# Patient Record
Sex: MALE | Race: WHITE
[De-identification: names, ages, dates, MRNs, and addresses within clinical notes are randomized per-mention and may not be internally consistent; named-entity substitution may affect disease eponyms.]

---

## 2021-08-31 ENCOUNTER — HOSPITAL ENCOUNTER (INPATIENT)
Dept: HOSPITAL 79 - ER1 | Age: 36
LOS: 2 days | Discharge: HOME | DRG: 177 | End: 2021-09-02
Attending: STUDENT IN AN ORGANIZED HEALTH CARE EDUCATION/TRAINING PROGRAM | Admitting: EMERGENCY MEDICINE
Payer: COMMERCIAL

## 2021-08-31 VITALS — HEIGHT: 74 IN | BODY MASS INDEX: 40.43 KG/M2 | WEIGHT: 315 LBS

## 2021-08-31 DIAGNOSIS — R73.03: ICD-10-CM

## 2021-08-31 DIAGNOSIS — K76.0: ICD-10-CM

## 2021-08-31 DIAGNOSIS — U07.1: Primary | ICD-10-CM

## 2021-08-31 DIAGNOSIS — Z79.899: ICD-10-CM

## 2021-08-31 DIAGNOSIS — R74.01: ICD-10-CM

## 2021-08-31 DIAGNOSIS — J12.82: ICD-10-CM

## 2021-08-31 DIAGNOSIS — E66.01: ICD-10-CM

## 2021-08-31 DIAGNOSIS — J96.01: ICD-10-CM

## 2021-08-31 LAB
BUN/CREATININE RATIO: 13 (ref 0–10)
HGB BLD-MCNC: 14.1 GM/DL (ref 14–17.5)
RED BLOOD COUNT: 5.09 M/UL (ref 4.2–5.5)
WHITE BLOOD COUNT: 6.1 K/UL (ref 4.5–11)

## 2021-08-31 PROCEDURE — 3E0333Z INTRODUCTION OF ANTI-INFLAMMATORY INTO PERIPHERAL VEIN, PERCUTANEOUS APPROACH: ICD-10-PCS | Performed by: EMERGENCY MEDICINE

## 2021-08-31 PROCEDURE — G0378 HOSPITAL OBSERVATION PER HR: HCPCS

## 2021-09-01 LAB
BUN/CREATININE RATIO: 16 (ref 0–10)
HGB BLD-MCNC: 13.6 GM/DL (ref 14–17.5)
RED BLOOD COUNT: 4.97 M/UL (ref 4.2–5.5)
WHITE BLOOD COUNT: 4.7 K/UL (ref 4.5–11)

## 2021-09-01 PROCEDURE — XW033E5 INTRODUCTION OF REMDESIVIR ANTI-INFECTIVE INTO PERIPHERAL VEIN, PERCUTANEOUS APPROACH, NEW TECHNOLOGY GROUP 5: ICD-10-PCS | Performed by: STUDENT IN AN ORGANIZED HEALTH CARE EDUCATION/TRAINING PROGRAM

## 2021-09-02 LAB
BUN/CREATININE RATIO: 18 (ref 0–10)
HGB BLD-MCNC: 13 GM/DL (ref 14–17.5)
RED BLOOD COUNT: 4.79 M/UL (ref 4.2–5.5)
WHITE BLOOD COUNT: 7.9 K/UL (ref 4.5–11)

## 2021-09-02 NOTE — NUR
PATIENT ROOM AIR SATURATION WITH AMBULATION NOTED TO BE 91%. PROVIDER AND CASE
MANAGER MADE AWARE. NO NEED FOR HOME OXYGEN AT THIS TIME PER PROVIDER.

## 2021-10-18 ENCOUNTER — HOSPITAL ENCOUNTER (INPATIENT)
Dept: HOSPITAL 79 - ER1 | Age: 36
LOS: 4 days | Discharge: HOME | DRG: 638 | End: 2021-10-22
Attending: INTERNAL MEDICINE | Admitting: INTERNAL MEDICINE
Payer: COMMERCIAL

## 2021-10-18 VITALS — BODY MASS INDEX: 42.66 KG/M2 | WEIGHT: 315 LBS | HEIGHT: 72 IN

## 2021-10-18 DIAGNOSIS — R09.02: ICD-10-CM

## 2021-10-18 DIAGNOSIS — E87.0: ICD-10-CM

## 2021-10-18 DIAGNOSIS — N17.9: ICD-10-CM

## 2021-10-18 DIAGNOSIS — Z79.4: ICD-10-CM

## 2021-10-18 DIAGNOSIS — Z86.16: ICD-10-CM

## 2021-10-18 DIAGNOSIS — Z79.899: ICD-10-CM

## 2021-10-18 DIAGNOSIS — Z87.891: ICD-10-CM

## 2021-10-18 DIAGNOSIS — E11.10: Primary | ICD-10-CM

## 2021-10-18 LAB
BUN/CREATININE RATIO: 30 (ref 0–10)
HGB BLD-MCNC: 17.3 GM/DL (ref 14–17.5)
RED BLOOD COUNT: 6.28 M/UL (ref 4.2–5.5)
WHITE BLOOD COUNT: 16.5 K/UL (ref 4.5–11)

## 2021-10-18 PROCEDURE — C9113 INJ PANTOPRAZOLE SODIUM, VIA: HCPCS

## 2021-10-18 PROCEDURE — U0002 COVID-19 LAB TEST NON-CDC: HCPCS

## 2021-10-19 LAB
BUN/CREATININE RATIO: 25 (ref 0–10)
BUN/CREATININE RATIO: 25 (ref 0–10)
BUN/CREATININE RATIO: 27 (ref 0–10)
BUN/CREATININE RATIO: 28 (ref 0–10)
BUN/CREATININE RATIO: 28 (ref 0–10)
HGB BLD-MCNC: 15.7 GM/DL (ref 14–17.5)
RED BLOOD COUNT: 5.47 M/UL (ref 4.2–5.5)
WHITE BLOOD COUNT: 17.3 K/UL (ref 4.5–11)

## 2021-10-20 LAB
BUN/CREATININE RATIO: 20 (ref 0–10)
BUN/CREATININE RATIO: 21 (ref 0–10)
HGB BLD-MCNC: 13.4 GM/DL (ref 14–17.5)
RED BLOOD COUNT: 4.72 M/UL (ref 4.2–5.5)
WHITE BLOOD COUNT: 10.3 K/UL (ref 4.5–11)

## 2021-10-20 NOTE — NUR
OUT OF BED AND URINATED IN FLOOR. ROOM CLEANED BY HOUSEKEEPING . LINENS
CHANGED AND BATH COMPLETED. ASSIST BACK TO BED AFTER BATH. PATIENT VERBALIZED
UNDERSTANDING TO USE CALL BELL FOR ASSISTANCE.

## 2021-10-21 LAB — BUN/CREATININE RATIO: 23 (ref 0–10)

## 2021-10-22 LAB — BUN/CREATININE RATIO: 22 (ref 0–10)
